# Patient Record
Sex: MALE | Race: WHITE | NOT HISPANIC OR LATINO | URBAN - METROPOLITAN AREA
[De-identification: names, ages, dates, MRNs, and addresses within clinical notes are randomized per-mention and may not be internally consistent; named-entity substitution may affect disease eponyms.]

---

## 2020-06-01 ENCOUNTER — OFFICE VISIT (OUTPATIENT)
Dept: URBAN - METROPOLITAN AREA CLINIC 17 | Facility: CLINIC | Age: 75
End: 2020-06-01
Payer: COMMERCIAL

## 2020-06-01 ENCOUNTER — SURGERY (OUTPATIENT)
Dept: URBAN - METROPOLITAN AREA SURGERY 7 | Facility: SURGERY | Age: 75
End: 2020-06-01
Payer: COMMERCIAL

## 2020-06-01 DIAGNOSIS — H47.393: ICD-10-CM

## 2020-06-01 DIAGNOSIS — H40.033 ANATOMICAL NARROW ANGLE, BILATERAL: Primary | ICD-10-CM

## 2020-06-01 DIAGNOSIS — H47.13 PAPILLEDEMA ASSOCIATED WITH RETINAL DISORDER: ICD-10-CM

## 2020-06-01 DIAGNOSIS — H47.292 OTHER OPTIC ATROPHY, LEFT EYE: ICD-10-CM

## 2020-06-01 PROCEDURE — 99204 OFFICE O/P NEW MOD 45 MIN: CPT | Performed by: OPHTHALMOLOGY

## 2020-06-01 PROCEDURE — 92020 GONIOSCOPY: CPT | Performed by: OPHTHALMOLOGY

## 2020-06-01 PROCEDURE — 76514 ECHO EXAM OF EYE THICKNESS: CPT | Performed by: OPHTHALMOLOGY

## 2020-06-01 RX ORDER — PREDNISOLONE ACETATE 10 MG/ML
1 % SUSPENSION/ DROPS OPHTHALMIC
Qty: 1 | Refills: 1 | Status: INACTIVE
Start: 2020-06-01 | End: 2020-06-01

## 2020-06-01 RX ORDER — PREDNISOLONE ACETATE 10 MG/ML
1 % SUSPENSION/ DROPS OPHTHALMIC
Qty: 1 | Refills: 1 | Status: INACTIVE
Start: 2020-06-01 | End: 2020-06-29

## 2020-06-01 RX ORDER — BRIMONIDINE TARTRATE 1.5 MG/ML
0.15 % SOLUTION OPHTHALMIC
Qty: 15 | Refills: 3 | Status: INACTIVE
Start: 2020-06-01 | End: 2020-09-01

## 2020-06-01 ASSESSMENT — INTRAOCULAR PRESSURE
OD: 13
OD: 13
OS: 13
OS: 13

## 2020-06-01 NOTE — IMPRESSION/PLAN
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 Plan: Pt presents with narrow angle, new NVI and papilledema OS 
H/O HTN; no known DM Pt to have LPI urgently today and to be referred to retina for consultation and treatment for papilledema and NVI

## 2020-06-01 NOTE — IMPRESSION/PLAN
Impression: Anatomical narrow angle, bilateral: H40.033. Possible glaucomaflecen on lens capsule OS  Plan: Pt has Glaucoma    Gonio :CORY        Pachs:562/615      Today's IOP : 13 OU Target IOP low to mid teens Pt denies Fhx of Glaucoma Right eye is the better seeing eye Tay Lovingger Pt denies Sulfa Allergy   // Pt denies Lung /Heart dx Pt is currently using : Brimonidine TID OS, Dorzolamide TID OS, Timolol BID OS Plan :
1. . Recommend LPI OU ; Discussed Risks and benefits of LPI. Patient is aware that there is a 20% chance that they may require enlargement of the LPI to create an ideal sized iridotomy. Patient may experience an inferior floater following the LPI procedure. The patient is aware that if angle closure occurs, they may experience significant vision loss and possibly complete loss of all sight in the affected eye. Will rx Pred Q2h Day of procedure then taper, QID for 7 days then discontinue. 2. Pt agrees with plan and wishes to move forward 3. Pt was given written literature on NAG 4. Schedule LPI ER OU Today Risk Level 1

## 2020-06-29 ENCOUNTER — OFFICE VISIT (OUTPATIENT)
Dept: URBAN - METROPOLITAN AREA CLINIC 17 | Facility: CLINIC | Age: 75
End: 2020-06-29
Payer: COMMERCIAL

## 2020-06-29 ENCOUNTER — SURGERY (OUTPATIENT)
Dept: URBAN - METROPOLITAN AREA SURGERY 7 | Facility: SURGERY | Age: 75
End: 2020-06-29
Payer: COMMERCIAL

## 2020-06-29 PROCEDURE — 92014 COMPRE OPH EXAM EST PT 1/>: CPT | Performed by: OPHTHALMOLOGY

## 2020-06-29 PROCEDURE — 92020 GONIOSCOPY: CPT | Performed by: OPHTHALMOLOGY

## 2020-06-29 PROCEDURE — 66761 REVISION OF IRIS: CPT | Performed by: OPHTHALMOLOGY

## 2020-06-29 RX ORDER — DORZOLAMIDE HYDROCHLORIDE AND TIMOLOL MALEATE 20; 5 MG/ML; MG/ML
SOLUTION/ DROPS OPHTHALMIC
Qty: 1 | Refills: 3 | Status: INACTIVE
Start: 2020-06-29 | End: 2020-09-01

## 2020-06-29 RX ORDER — PREDNISOLONE ACETATE 10 MG/ML
1 % SUSPENSION/ DROPS OPHTHALMIC
Qty: 1 | Refills: 1 | Status: INACTIVE
Start: 2020-06-29 | End: 2020-07-27

## 2020-06-29 ASSESSMENT — INTRAOCULAR PRESSURE
OS: 40
OD: 11
OS: 40
OD: 11

## 2020-06-29 NOTE — IMPRESSION/PLAN
Impression: Anatomical narrow angle, bilateral: H40.033. Possible glaucomaflecen on lens capsule OS Plan: Pt has Glaucoma    Gonio :CORY        Pachs:562/615      Today's IOP : 13 OU Target IOP low to mid teens Pt denies Fhx of Glaucoma Right eye is the better seeing eye ON: 0.3x0.3/0.5x0.4
OCT:97/152 Pt denies Sulfa Allergy   // Pt denies Lung /Heart dx Pt is currently using : Brimonidine TID OS, Dorzolamide TID OS, Timolol BID OS, Pred TID OS, Pilocarpine BID OS Plan :
 S/p LPI OU 6/1/20 1. Pt is doing well s/p LPI  OU; LPI patent OD, However no TI on the Left eye 2. Possible complete angle closure on the left eye.   Patient will need a repeat LPI on the left eye, there are is no transilumination and the IOP is very high, patient still using pred QID

## 2020-06-30 ENCOUNTER — POST-OPERATIVE VISIT (OUTPATIENT)
Dept: URBAN - METROPOLITAN AREA CLINIC 17 | Facility: CLINIC | Age: 75
End: 2020-06-30

## 2020-06-30 DIAGNOSIS — Z48.810 ENCNTR FOR SURGICAL AFTCR FOL SURGERY ON THE SENSE ORGANS: ICD-10-CM

## 2020-06-30 DIAGNOSIS — Z09 ENCNTR FOR F/U EXAM AFT TRTMT FOR COND OTH THAN MALIG NEOPLM: Primary | ICD-10-CM

## 2020-06-30 PROCEDURE — 99024 POSTOP FOLLOW-UP VISIT: CPT | Performed by: OPTOMETRIST

## 2020-06-30 ASSESSMENT — INTRAOCULAR PRESSURE
OD: 13
OS: 27

## 2020-07-27 ENCOUNTER — OFFICE VISIT (OUTPATIENT)
Dept: URBAN - METROPOLITAN AREA CLINIC 17 | Facility: CLINIC | Age: 75
End: 2020-07-27
Payer: COMMERCIAL

## 2020-07-27 DIAGNOSIS — H25.13 AGE-RELATED NUCLEAR CATARACT, BILATERAL: ICD-10-CM

## 2020-07-27 DIAGNOSIS — H40.2222 CHRONIC ANGLE-CLOSURE GLAUCOMA, LEFT EYE, MODERATE STAGE: Primary | ICD-10-CM

## 2020-07-27 PROCEDURE — 92014 COMPRE OPH EXAM EST PT 1/>: CPT | Performed by: OPHTHALMOLOGY

## 2020-07-27 RX ORDER — KETOROLAC TROMETHAMINE 5 MG/ML
0.5 % SOLUTION OPHTHALMIC
Qty: 1 | Refills: 1 | Status: ACTIVE
Start: 2020-07-27

## 2020-07-27 RX ORDER — OFLOXACIN 3 MG/ML
0.3 % SOLUTION/ DROPS OPHTHALMIC
Qty: 1 | Refills: 1 | Status: ACTIVE
Start: 2020-07-27

## 2020-07-27 RX ORDER — PREDNISOLONE ACETATE 10 MG/ML
1 % SUSPENSION/ DROPS OPHTHALMIC
Qty: 1 | Refills: 1 | Status: INACTIVE
Start: 2020-07-27 | End: 2020-09-22

## 2020-07-27 RX ORDER — BRIMONIDINE TARTRATE 2 MG/ML
0.2 % SOLUTION/ DROPS OPHTHALMIC
Qty: 1 | Refills: 1 | Status: INACTIVE
Start: 2020-07-27 | End: 2020-09-01

## 2020-07-27 ASSESSMENT — INTRAOCULAR PRESSURE
OS: 48
OD: 15

## 2020-07-27 NOTE — IMPRESSION/PLAN
Impression: Age-related nuclear cataract, bilateral: H25.13. Plan: Cataracts account for the patient's complaints. Discussed all risks, benefits, procedures and recovery. Patient understands changing glasses will not improve vision. Patient desires to have surgery, recommend phacoemulsification with intraocular lens. Plan for : CE IOL + Baerveldt Tube with 2 Tube Fenestrations OS
AIM: PLANO Requires SPH, GLARE, MRx - Full Cat Eval

## 2020-07-27 NOTE — IMPRESSION/PLAN
Impression: Chronic angle-closure glaucoma, left eye, moderate stage: E41.0937. Plan: Pt has Glaucoma    Gonio :CORY        Pachs:562/615      Today's IOP : 15, 48 Target IOP low to mid teens Pt denies Fhx of Glaucoma Right eye is the better seeing eye ON: 0.3x0.3/0.5x0.4
OCT:97/152 Pt denies Sulfa Allergy   // Pt denies Lung /Heart dx Pt is currently using : Brimonidine TID OS, Dorzolamide TID OS, Timolol BID OS, Pred TID OS, Pilocarpine BID OS Plan :
 S/p LPI OU 6/1/20 1. Pt is doing well s/p LPI  OU; LPI patent OD, However no TI on the Left eye 2. Cont:
Dorzolamide BID OS Timolol BID OS
ADD Brimonidine BID OS Ty BID OS
3. Based on continued elevated IOP OS, will need CE IOL + Baerveldt Tube. The Patient is aware that the risks of Baerveldt tube shunt include but are not limited to complete blindness , loss of vision and loss of the eye, bleeding, infection, inflammation, Hypotony, Persistent IOP elevation, intractable diplopia , and orbital or ocular inflammation. The Patient is aware that there is a 10 % chance of permanent double vision which cannot be remedied by any means including glasses or strabismus surgery. The patient is aware that failure to lower IOP will likely lead to progressive sight loss and possibly blindness . Pt understands. *** 90 minute case***
**PCC's Please notify OR to order device 4.  Schedule CE IOL + Baerveldt Tube OS
2 Tube Fenestrations

## 2020-09-21 ENCOUNTER — OFFICE VISIT (OUTPATIENT)
Dept: URBAN - METROPOLITAN AREA CLINIC 17 | Facility: CLINIC | Age: 75
End: 2020-09-21
Payer: COMMERCIAL

## 2020-09-21 PROCEDURE — 92014 COMPRE OPH EXAM EST PT 1/>: CPT | Performed by: OPHTHALMOLOGY

## 2020-09-21 RX ORDER — OFLOXACIN 3 MG/ML
0.3 % SOLUTION/ DROPS OPHTHALMIC
Qty: 1 | Refills: 1 | Status: INACTIVE
Start: 2020-09-21 | End: 2020-09-22

## 2020-09-21 ASSESSMENT — INTRAOCULAR PRESSURE
OD: 15
OS: 41

## 2020-09-21 ASSESSMENT — KERATOMETRY
OD: 41.25
OS: 40.75

## 2020-09-21 ASSESSMENT — VISUAL ACUITY
OD: 20/40
OS: 20/400

## 2020-09-21 NOTE — IMPRESSION/PLAN
Impression: Chronic angle-closure glaucoma, left eye, moderate stage: H99.0370. Plan: Pt has Glaucoma    Gonio :CORY        Pachs:562/615      Today's IOP : 15, 41 Target IOP low to mid teens Pt denies Fhx of Glaucoma Right eye is the better seeing eye ON: 0.3x0.3/ .85-. 8 loss of temporal rim Jad Peterson Pt denies Sulfa Allergy   // Pt denies Lung /Heart dx Pt is currently using : Brimonidine TID OS, Dorzolamide TID OS, Timolol BID OS, Pred TID OS, Pilocarpine BID OS Plan :
 S/p LPI OU 6/1/20 1. Pt is doing well s/p LPI  OU; LPI patent OD, However no TI on the Left eye 2. Cont:
Dorzolamide BID OS Timolol BID OS
ADD Brimonidine BID OS Ty BID OS
3. Based on continued elevated IOP OS, will need CE IOL + Baerveldt Tube. The Patient is aware that the risks of Baerveldt tube shunt include but are not limited to complete blindness , loss of vision and loss of the eye, bleeding, infection, inflammation, Hypotony, Persistent IOP elevation, intractable diplopia , and orbital or ocular inflammation. The Patient is aware that there is a 10 % chance of permanent double vision which cannot be remedied by any means including glasses or strabismus surgery. The patient is aware that failure to lower IOP will likely lead to progressive sight loss and possibly blindness . Pt understands. *** 90 minute case***
**PCC's Please notify OR to order device 4.  Schedule CE IOL + Baerveldt Tube OS
2 Tube Fenestrations

## 2020-09-22 ENCOUNTER — Encounter (OUTPATIENT)
Dept: URBAN - METROPOLITAN AREA CLINIC 24 | Facility: CLINIC | Age: 75
End: 2020-09-22
Payer: COMMERCIAL

## 2020-09-22 DIAGNOSIS — Z01.818 ENCOUNTER FOR OTHER PREPROCEDURAL EXAMINATION: Primary | ICD-10-CM

## 2020-09-22 PROCEDURE — 99213 OFFICE O/P EST LOW 20 MIN: CPT | Performed by: PHYSICIAN ASSISTANT

## 2020-09-24 ENCOUNTER — SURGERY (OUTPATIENT)
Dept: URBAN - METROPOLITAN AREA SURGERY 12 | Facility: SURGERY | Age: 75
End: 2020-09-24
Payer: COMMERCIAL

## 2020-09-24 PROCEDURE — 66180 AQUEOUS SHUNT EYE W/GRAFT: CPT | Performed by: OPHTHALMOLOGY

## 2020-09-25 ENCOUNTER — POST OP (OUTPATIENT)
Dept: URBAN - METROPOLITAN AREA CLINIC 24 | Facility: CLINIC | Age: 75
End: 2020-09-25
Payer: COMMERCIAL

## 2020-09-25 DIAGNOSIS — Z96.1 PRESENCE OF INTRAOCULAR LENS: Primary | ICD-10-CM

## 2020-09-25 PROCEDURE — 99024 POSTOP FOLLOW-UP VISIT: CPT | Performed by: OPTOMETRIST

## 2020-09-25 ASSESSMENT — INTRAOCULAR PRESSURE
OS: 40
OD: 39

## 2020-10-01 ENCOUNTER — POST OP (OUTPATIENT)
Dept: URBAN - METROPOLITAN AREA CLINIC 24 | Facility: CLINIC | Age: 75
End: 2020-10-01
Payer: COMMERCIAL

## 2020-10-01 PROCEDURE — 99024 POSTOP FOLLOW-UP VISIT: CPT | Performed by: OPTOMETRIST

## 2020-10-01 ASSESSMENT — VISUAL ACUITY
OD: 20/40
OS: 20/LP

## 2020-10-01 ASSESSMENT — INTRAOCULAR PRESSURE
OS: 16
OS: 10
OD: 12

## 2020-11-04 ENCOUNTER — POST OP (OUTPATIENT)
Dept: URBAN - METROPOLITAN AREA CLINIC 24 | Facility: CLINIC | Age: 75
End: 2020-11-04
Payer: COMMERCIAL

## 2020-11-04 PROCEDURE — 99024 POSTOP FOLLOW-UP VISIT: CPT | Performed by: OPHTHALMOLOGY

## 2020-11-04 PROCEDURE — 92015 DETERMINE REFRACTIVE STATE: CPT | Performed by: OPHTHALMOLOGY

## 2020-11-04 ASSESSMENT — INTRAOCULAR PRESSURE
OS: 14
OD: 21

## 2020-11-04 ASSESSMENT — VISUAL ACUITY
OD: 20/20
OS: 20/HM

## 2020-12-10 ENCOUNTER — POST OP (OUTPATIENT)
Dept: URBAN - METROPOLITAN AREA CLINIC 24 | Facility: CLINIC | Age: 75
End: 2020-12-10
Payer: COMMERCIAL

## 2020-12-10 PROCEDURE — 99024 POSTOP FOLLOW-UP VISIT: CPT | Performed by: OPHTHALMOLOGY

## 2020-12-10 ASSESSMENT — INTRAOCULAR PRESSURE
OD: 19
OS: 25

## 2021-03-24 ENCOUNTER — FOLLOW UP ESTABLISHED (OUTPATIENT)
Dept: URBAN - METROPOLITAN AREA CLINIC 24 | Facility: CLINIC | Age: 76
End: 2021-03-24
Payer: COMMERCIAL

## 2021-03-24 PROCEDURE — 99214 OFFICE O/P EST MOD 30 MIN: CPT | Performed by: OPHTHALMOLOGY

## 2021-03-24 RX ORDER — DORZOLAMIDE HCL 20 MG/ML
2 % SOLUTION/ DROPS OPHTHALMIC
Qty: 15 | Refills: 3 | Status: INACTIVE
Start: 2021-03-24 | End: 2021-05-27

## 2021-03-24 ASSESSMENT — INTRAOCULAR PRESSURE
OD: 19
OS: 19

## 2021-05-27 ENCOUNTER — OFFICE VISIT (OUTPATIENT)
Dept: URBAN - METROPOLITAN AREA CLINIC 24 | Facility: CLINIC | Age: 76
End: 2021-05-27
Payer: COMMERCIAL

## 2021-05-27 DIAGNOSIS — H25.11 AGE-RELATED NUCLEAR CATARACT, RIGHT EYE: ICD-10-CM

## 2021-05-27 PROCEDURE — 99213 OFFICE O/P EST LOW 20 MIN: CPT | Performed by: OPHTHALMOLOGY

## 2021-05-27 RX ORDER — DORZOLAMIDE HCL 20 MG/ML
2 % SOLUTION/ DROPS OPHTHALMIC
Qty: 30 | Refills: 3 | Status: INACTIVE
Start: 2021-05-27 | End: 2021-11-29

## 2021-05-27 ASSESSMENT — INTRAOCULAR PRESSURE
OD: 13
OS: 9

## 2021-05-27 NOTE — IMPRESSION/PLAN
Impression: Chronic angle-closure glaucoma, left eye, moderate stage: J04.0918.
-- s/p CE IOL + Baerveldt Tube OS - Dr Rejeana Litten
-- Brimonidine was used, but intolerable. Plan: Pt has Glaucoma    Gonio :CORY        Pachs:562/615      Today's IOP : 13, 9 Target IOP low to mid teens Pt denies Fhx of Glaucoma Right eye is the better seeing eye ON: .4-.4 / .9-.9 Saud Vizcaino Pt denies Sulfa Allergy   // Pt denies Lung /Heart dx Plan :
 S/p IOL  W tube shunt 9/23/20 Opened as of 12/10/2020 1. Cont:
Dorzolamide TID OS 2. Patient is doing well ; IOP is stable. No changes being made. 
3. Return in 1 month for CE with Refraction with optometry and 6 months for Dr Rejeana Litten

## 2021-06-29 ENCOUNTER — OFFICE VISIT (OUTPATIENT)
Dept: URBAN - METROPOLITAN AREA CLINIC 24 | Facility: CLINIC | Age: 76
End: 2021-06-29
Payer: COMMERCIAL

## 2021-06-29 DIAGNOSIS — H52.4 PRESBYOPIA: ICD-10-CM

## 2021-06-29 DIAGNOSIS — H25.811 COMBINED FORMS OF AGE-RELATED CATARACT, RIGHT EYE: Primary | ICD-10-CM

## 2021-06-29 DIAGNOSIS — H40.2223 CHRONIC ANGLE-CLOSURE GLAUCOMA, LEFT EYE, SEVERE STAGE: ICD-10-CM

## 2021-06-29 PROCEDURE — 99214 OFFICE O/P EST MOD 30 MIN: CPT | Performed by: OPTOMETRIST

## 2021-06-29 ASSESSMENT — KERATOMETRY
OD: 40.81
OS: 40.09

## 2021-06-29 ASSESSMENT — INTRAOCULAR PRESSURE
OD: 13
OS: 9

## 2021-06-29 ASSESSMENT — VISUAL ACUITY: OD: 20/30

## 2021-07-01 NOTE — IMPRESSION/PLAN
Impression: Combined forms of age-related cataract, right eye: H25.811. Plan: No treatment currently recommended due to level of vision / lack of functional complaints. Patient will monitor vision changes and contact us with any decrease in vision, will re-evaluate cataract on return visit.

## 2021-07-01 NOTE — IMPRESSION/PLAN
Impression: Chronic angle-closure glaucoma, left eye, severe stage: F46.1367. --Pt has Glaucoma    Gonio :CORY        Pachs:562/615      Today's IOP : 13, 9
--Target IOP low to mid teens
--Pt denies Fhx of Glaucoma
--Right eye is the better seeing eye  
--Pt denies Sulfa Allergy   // Pt denies Lung /Heart dx 
--s/p IOL  W tube shunt 9/23/20 Opened as of 12/10/2020 Plan: Limiting issue. Continue gtts / ongoing care Dr. Jovana Bonilla as directed.

## 2021-12-02 ENCOUNTER — OFFICE VISIT (OUTPATIENT)
Dept: URBAN - METROPOLITAN AREA CLINIC 24 | Facility: CLINIC | Age: 76
End: 2021-12-02
Payer: COMMERCIAL

## 2021-12-02 PROCEDURE — 99213 OFFICE O/P EST LOW 20 MIN: CPT | Performed by: OPHTHALMOLOGY

## 2021-12-02 ASSESSMENT — INTRAOCULAR PRESSURE
OS: 13
OD: 16

## 2021-12-02 NOTE — IMPRESSION/PLAN
Impression: Chronic angle-closure glaucoma, left eye, moderate stage: B88.7347.
-- s/p CE IOL + Baerveldt Tube OS  Opened as of 12/10/2020- Dr Yossi Sweeney
-- Brimonidine was used, but intolerable. Plan: Pt has Glaucoma    Gonio :CORY        Pachs:562/615      Today's IOP : 16/ 13 Target IOP low to mid teens Pt denies Fhx of Glaucoma Right eye is the better seeing eye ON: .4-.4 / .9-.9 Angela Bojorquez Pt denies Sulfa Allergy   // Pt denies Lung /Heart dx Plan :
 1. Cont:
Dorzolamide TID OS 2. Patient is doing well ; IOP is stable. No changes being made. 
3. Return to [facility] for CE in June. 1 Year IOP, VF, RNFL with Dr. Yossi Sweeney

## 2024-02-26 ENCOUNTER — OFFICE VISIT (OUTPATIENT)
Dept: URBAN - METROPOLITAN AREA CLINIC 17 | Facility: CLINIC | Age: 79
End: 2024-02-26
Payer: COMMERCIAL

## 2024-02-26 DIAGNOSIS — H40.2222 CHRONIC ANGLE-CLOSURE GLAUCOMA, LEFT EYE, MODERATE STAGE: Primary | ICD-10-CM

## 2024-02-26 DIAGNOSIS — H43.812 VITREOUS DEGENERATION, LEFT EYE: ICD-10-CM

## 2024-02-26 DIAGNOSIS — H40.1111 PRIMARY OPEN-ANGLE GLAUCOMA, RIGHT EYE, MILD STAGE: ICD-10-CM

## 2024-02-26 DIAGNOSIS — H26.492 OTHER SECONDARY CATARACT OF LEFT EYE: ICD-10-CM

## 2024-02-26 DIAGNOSIS — Z96.1 PRESENCE OF INTRAOCULAR LENS: ICD-10-CM

## 2024-02-26 DIAGNOSIS — H04.123 DRY EYE SYNDROME OF BILATERAL LACRIMAL GLANDS: ICD-10-CM

## 2024-02-26 DIAGNOSIS — H25.811 COMBINED FORMS OF AGE-RELATED CATARACT, RIGHT EYE: ICD-10-CM

## 2024-02-26 PROCEDURE — 92004 COMPRE OPH EXAM NEW PT 1/>: CPT | Performed by: OPTOMETRIST

## 2024-02-26 PROCEDURE — 92083 EXTENDED VISUAL FIELD XM: CPT | Performed by: OPTOMETRIST

## 2024-02-26 RX ORDER — DORZOLAMIDE HCL 20 MG/ML
2 % SOLUTION/ DROPS OPHTHALMIC
Qty: 10 | Refills: 4 | Status: ACTIVE
Start: 2024-02-26

## 2024-02-26 ASSESSMENT — INTRAOCULAR PRESSURE
OS: 10
OD: 15

## 2024-06-24 ENCOUNTER — OFFICE VISIT (OUTPATIENT)
Dept: URBAN - METROPOLITAN AREA CLINIC 17 | Facility: CLINIC | Age: 79
End: 2024-06-24
Payer: COMMERCIAL

## 2024-06-24 DIAGNOSIS — H40.1111 PRIMARY OPEN-ANGLE GLAUCOMA, RIGHT EYE, MILD STAGE: ICD-10-CM

## 2024-06-24 DIAGNOSIS — H04.123 DRY EYE SYNDROME OF BILATERAL LACRIMAL GLANDS: ICD-10-CM

## 2024-06-24 DIAGNOSIS — H40.2222 CHRONIC ANGLE-CLOSURE GLAUCOMA, LEFT EYE, MODERATE STAGE: Primary | ICD-10-CM

## 2024-06-24 DIAGNOSIS — H25.811 COMBINED FORMS OF AGE-RELATED CATARACT, RIGHT EYE: ICD-10-CM

## 2024-06-24 PROCEDURE — 99213 OFFICE O/P EST LOW 20 MIN: CPT | Performed by: OPTOMETRIST

## 2024-06-24 RX ORDER — DORZOLAMIDE HCL 20 MG/ML
2 % SOLUTION/ DROPS OPHTHALMIC
Qty: 10 | Refills: 4 | Status: ACTIVE
Start: 2024-06-24

## 2024-06-24 ASSESSMENT — INTRAOCULAR PRESSURE
OS: 10
OD: 13

## 2024-08-06 ENCOUNTER — OFFICE VISIT (OUTPATIENT)
Dept: URBAN - METROPOLITAN AREA CLINIC 17 | Facility: CLINIC | Age: 79
End: 2024-08-06
Payer: COMMERCIAL

## 2024-08-06 DIAGNOSIS — H52.223 REGULAR ASTIGMATISM, BILATERAL: Primary | ICD-10-CM

## 2024-08-06 PROCEDURE — 92012 INTRM OPH EXAM EST PATIENT: CPT | Performed by: OPTOMETRIST

## 2024-08-06 RX ORDER — DORZOLAMIDE HCL 20 MG/ML
2 % SOLUTION/ DROPS OPHTHALMIC
Qty: 10 | Refills: 4 | Status: ACTIVE
Start: 2024-08-06

## 2024-08-06 ASSESSMENT — INTRAOCULAR PRESSURE
OD: 12
OS: 9

## 2024-08-06 ASSESSMENT — VISUAL ACUITY: OD: 20/25

## 2024-10-28 ENCOUNTER — OFFICE VISIT (OUTPATIENT)
Dept: URBAN - METROPOLITAN AREA CLINIC 17 | Facility: CLINIC | Age: 79
End: 2024-10-28
Payer: COMMERCIAL

## 2024-10-28 DIAGNOSIS — H40.1111 PRIMARY OPEN-ANGLE GLAUCOMA, RIGHT EYE, MILD STAGE: ICD-10-CM

## 2024-10-28 DIAGNOSIS — H04.123 DRY EYE SYNDROME OF BILATERAL LACRIMAL GLANDS: ICD-10-CM

## 2024-10-28 DIAGNOSIS — H40.2222 CHRONIC ANGLE-CLOSURE GLAUCOMA, LEFT EYE, MODERATE STAGE: Primary | ICD-10-CM

## 2024-10-28 PROCEDURE — 99213 OFFICE O/P EST LOW 20 MIN: CPT | Performed by: OPTOMETRIST

## 2024-10-28 RX ORDER — DORZOLAMIDE HCL 20 MG/ML
2 % SOLUTION/ DROPS OPHTHALMIC
Qty: 10 | Refills: 4 | Status: ACTIVE
Start: 2024-10-28

## 2024-10-28 ASSESSMENT — INTRAOCULAR PRESSURE
OD: 14
OS: 10

## 2025-02-26 ENCOUNTER — OFFICE VISIT (OUTPATIENT)
Dept: URBAN - METROPOLITAN AREA CLINIC 17 | Facility: CLINIC | Age: 80
End: 2025-02-26
Payer: COMMERCIAL

## 2025-02-26 DIAGNOSIS — H40.2222 CHRONIC ANGLE-CLOSURE GLAUCOMA, LEFT EYE, MODERATE STAGE: Primary | ICD-10-CM

## 2025-02-26 DIAGNOSIS — H40.1111 PRIMARY OPEN-ANGLE GLAUCOMA, RIGHT EYE, MILD STAGE: ICD-10-CM

## 2025-02-26 DIAGNOSIS — H04.123 DRY EYE SYNDROME OF BILATERAL LACRIMAL GLANDS: ICD-10-CM

## 2025-02-26 PROCEDURE — 99213 OFFICE O/P EST LOW 20 MIN: CPT | Performed by: OPTOMETRIST

## 2025-02-26 RX ORDER — DORZOLAMIDE HCL 20 MG/ML
2 % SOLUTION/ DROPS OPHTHALMIC
Qty: 10 | Refills: 4 | Status: ACTIVE
Start: 2025-02-26

## 2025-02-26 ASSESSMENT — INTRAOCULAR PRESSURE
OS: 10
OD: 15

## 2025-07-09 ENCOUNTER — OFFICE VISIT (OUTPATIENT)
Dept: URBAN - METROPOLITAN AREA CLINIC 17 | Facility: CLINIC | Age: 80
End: 2025-07-09
Payer: COMMERCIAL

## 2025-07-09 DIAGNOSIS — H04.123 DRY EYE SYNDROME OF BILATERAL LACRIMAL GLANDS: ICD-10-CM

## 2025-07-09 DIAGNOSIS — H43.812 VITREOUS DEGENERATION, LEFT EYE: ICD-10-CM

## 2025-07-09 DIAGNOSIS — H25.811 COMBINED FORMS OF AGE-RELATED CATARACT, RIGHT EYE: ICD-10-CM

## 2025-07-09 DIAGNOSIS — H26.492 OTHER SECONDARY CATARACT OF LEFT EYE: ICD-10-CM

## 2025-07-09 DIAGNOSIS — H40.1111 PRIMARY OPEN-ANGLE GLAUCOMA, RIGHT EYE, MILD STAGE: ICD-10-CM

## 2025-07-09 DIAGNOSIS — H40.2222 CHRONIC ANGLE-CLOSURE GLAUCOMA, LEFT EYE, MODERATE STAGE: Primary | ICD-10-CM

## 2025-07-09 DIAGNOSIS — Z96.1 PRESENCE OF INTRAOCULAR LENS: ICD-10-CM

## 2025-07-09 PROCEDURE — 92133 CPTRZD OPH DX IMG PST SGM ON: CPT | Performed by: OPTOMETRIST

## 2025-07-09 PROCEDURE — 92083 EXTENDED VISUAL FIELD XM: CPT | Performed by: OPTOMETRIST

## 2025-07-09 PROCEDURE — 92014 COMPRE OPH EXAM EST PT 1/>: CPT | Performed by: OPTOMETRIST

## 2025-07-09 RX ORDER — OFLOXACIN 3 MG/ML
0.3 % SOLUTION/ DROPS OPHTHALMIC
Qty: 5 | Refills: 0 | Status: INACTIVE
Start: 2025-07-09 | End: 2025-07-15

## 2025-07-09 RX ORDER — DORZOLAMIDE HCL 20 MG/ML
2 % SOLUTION/ DROPS OPHTHALMIC
Qty: 10 | Refills: 0 | Status: ACTIVE
Start: 2025-07-09

## 2025-07-09 ASSESSMENT — INTRAOCULAR PRESSURE
OD: 12
OS: 10